# Patient Record
Sex: FEMALE | Race: BLACK OR AFRICAN AMERICAN | Employment: OTHER | URBAN - METROPOLITAN AREA
[De-identification: names, ages, dates, MRNs, and addresses within clinical notes are randomized per-mention and may not be internally consistent; named-entity substitution may affect disease eponyms.]

---

## 2021-03-13 ENCOUNTER — IMMUNIZATION (OUTPATIENT)
Dept: FAMILY MEDICINE CLINIC | Age: 66
End: 2021-03-13
Payer: COMMERCIAL

## 2021-03-13 PROCEDURE — 91303 COVID-19, J&J VACCINE, PF, 0.5 ML DOSE: CPT | Performed by: NURSE PRACTITIONER

## 2021-03-13 PROCEDURE — 0031A PR IMM ADMN SARSCOV2 AD26 5X1010VP/0.5 ML 1 DOSE: CPT | Performed by: NURSE PRACTITIONER

## 2021-10-14 ENCOUNTER — HOSPITAL ENCOUNTER (EMERGENCY)
Age: 66
Discharge: HOME | End: 2021-10-14
Attending: EMERGENCY MEDICINE
Payer: COMMERCIAL

## 2021-10-14 ENCOUNTER — APPOINTMENT (OUTPATIENT)
Dept: GENERAL RADIOLOGY | Age: 66
End: 2021-10-14
Payer: COMMERCIAL

## 2021-10-14 VITALS
TEMPERATURE: 98.3 F | DIASTOLIC BLOOD PRESSURE: 77 MMHG | WEIGHT: 145 LBS | HEART RATE: 76 BPM | RESPIRATION RATE: 14 BRPM | HEIGHT: 64 IN | OXYGEN SATURATION: 99 % | BODY MASS INDEX: 24.75 KG/M2 | SYSTOLIC BLOOD PRESSURE: 127 MMHG

## 2021-10-14 DIAGNOSIS — R51.9 NONINTRACTABLE HEADACHE, UNSPECIFIED CHRONICITY PATTERN, UNSPECIFIED HEADACHE TYPE: Primary | ICD-10-CM

## 2021-10-14 LAB
ANION GAP SERPL CALCULATED.3IONS-SCNC: 14 MMOL/L (ref 3–16)
BASOPHILS ABSOLUTE: 0 K/UL (ref 0–0.2)
BASOPHILS RELATIVE PERCENT: 0.7 %
BUN BLDV-MCNC: 12 MG/DL (ref 7–20)
CALCIUM SERPL-MCNC: 9.6 MG/DL (ref 8.3–10.6)
CHLORIDE BLD-SCNC: 101 MMOL/L (ref 99–110)
CO2: 23 MMOL/L (ref 21–32)
CREAT SERPL-MCNC: 0.6 MG/DL (ref 0.6–1.2)
EKG ATRIAL RATE: 96 BPM
EKG DIAGNOSIS: NORMAL
EKG P AXIS: 76 DEGREES
EKG P-R INTERVAL: 130 MS
EKG Q-T INTERVAL: 364 MS
EKG QRS DURATION: 74 MS
EKG QTC CALCULATION (BAZETT): 459 MS
EKG R AXIS: 4 DEGREES
EKG T AXIS: 52 DEGREES
EKG VENTRICULAR RATE: 96 BPM
EOSINOPHILS ABSOLUTE: 0 K/UL (ref 0–0.6)
EOSINOPHILS RELATIVE PERCENT: 0.2 %
GFR AFRICAN AMERICAN: >60
GFR NON-AFRICAN AMERICAN: >60
GLUCOSE BLD-MCNC: 81 MG/DL (ref 70–99)
HCT VFR BLD CALC: 36.2 % (ref 36–48)
HEMOGLOBIN: 12.1 G/DL (ref 12–16)
LYMPHOCYTES ABSOLUTE: 1.3 K/UL (ref 1–5.1)
LYMPHOCYTES RELATIVE PERCENT: 29 %
MCH RBC QN AUTO: 30.2 PG (ref 26–34)
MCHC RBC AUTO-ENTMCNC: 33.5 G/DL (ref 31–36)
MCV RBC AUTO: 90.1 FL (ref 80–100)
MONOCYTES ABSOLUTE: 0.3 K/UL (ref 0–1.3)
MONOCYTES RELATIVE PERCENT: 6.1 %
NEUTROPHILS ABSOLUTE: 2.8 K/UL (ref 1.7–7.7)
NEUTROPHILS RELATIVE PERCENT: 64 %
PDW BLD-RTO: 14.3 % (ref 12.4–15.4)
PLATELET # BLD: 508 K/UL (ref 135–450)
PMV BLD AUTO: 7.8 FL (ref 5–10.5)
POTASSIUM REFLEX MAGNESIUM: 3.9 MMOL/L (ref 3.5–5.1)
RBC # BLD: 4.01 M/UL (ref 4–5.2)
SODIUM BLD-SCNC: 138 MMOL/L (ref 136–145)
TROPONIN: <0.01 NG/ML
WBC # BLD: 4.4 K/UL (ref 4–11)

## 2021-10-14 PROCEDURE — 96375 TX/PRO/DX INJ NEW DRUG ADDON: CPT

## 2021-10-14 PROCEDURE — 71046 X-RAY EXAM CHEST 2 VIEWS: CPT

## 2021-10-14 PROCEDURE — 96374 THER/PROPH/DIAG INJ IV PUSH: CPT

## 2021-10-14 PROCEDURE — 80048 BASIC METABOLIC PNL TOTAL CA: CPT

## 2021-10-14 PROCEDURE — 85025 COMPLETE CBC W/AUTO DIFF WBC: CPT

## 2021-10-14 PROCEDURE — 99284 EMERGENCY DEPT VISIT MOD MDM: CPT

## 2021-10-14 PROCEDURE — 93005 ELECTROCARDIOGRAM TRACING: CPT | Performed by: EMERGENCY MEDICINE

## 2021-10-14 PROCEDURE — 84484 ASSAY OF TROPONIN QUANT: CPT

## 2021-10-14 PROCEDURE — 96361 HYDRATE IV INFUSION ADD-ON: CPT

## 2021-10-14 PROCEDURE — 36415 COLL VENOUS BLD VENIPUNCTURE: CPT

## 2021-10-14 PROCEDURE — 2580000003 HC RX 258: Performed by: EMERGENCY MEDICINE

## 2021-10-14 PROCEDURE — 6360000002 HC RX W HCPCS: Performed by: EMERGENCY MEDICINE

## 2021-10-14 RX ORDER — DICLOFENAC POTASSIUM 50 MG/1
50 TABLET, FILM COATED ORAL 2 TIMES DAILY PRN
COMMUNITY

## 2021-10-14 RX ORDER — ONDANSETRON 2 MG/ML
4 INJECTION INTRAMUSCULAR; INTRAVENOUS ONCE
Status: COMPLETED | OUTPATIENT
Start: 2021-10-14 | End: 2021-10-14

## 2021-10-14 RX ORDER — PROCHLORPERAZINE EDISYLATE 5 MG/ML
5 INJECTION INTRAMUSCULAR; INTRAVENOUS ONCE
Status: COMPLETED | OUTPATIENT
Start: 2021-10-14 | End: 2021-10-14

## 2021-10-14 RX ORDER — SODIUM CHLORIDE, SODIUM LACTATE, POTASSIUM CHLORIDE, AND CALCIUM CHLORIDE .6; .31; .03; .02 G/100ML; G/100ML; G/100ML; G/100ML
1000 INJECTION, SOLUTION INTRAVENOUS ONCE
Status: COMPLETED | OUTPATIENT
Start: 2021-10-14 | End: 2021-10-14

## 2021-10-14 RX ORDER — KETOROLAC TROMETHAMINE 30 MG/ML
15 INJECTION, SOLUTION INTRAMUSCULAR; INTRAVENOUS ONCE
Status: COMPLETED | OUTPATIENT
Start: 2021-10-14 | End: 2021-10-14

## 2021-10-14 RX ORDER — LISINOPRIL 10 MG/1
10 TABLET ORAL DAILY
COMMUNITY
Start: 2021-08-25

## 2021-10-14 RX ORDER — AMOXICILLIN 500 MG/1
500 CAPSULE ORAL 2 TIMES DAILY
COMMUNITY
Start: 2021-10-04

## 2021-10-14 RX ORDER — ACETAMINOPHEN 500 MG
1000 TABLET ORAL EVERY 6 HOURS PRN
COMMUNITY

## 2021-10-14 RX ORDER — LORAZEPAM 0.5 MG/1
0.5 TABLET ORAL DAILY
COMMUNITY
Start: 2020-09-14

## 2021-10-14 RX ORDER — CYCLOBENZAPRINE HCL 10 MG
10 TABLET ORAL 2 TIMES DAILY PRN
COMMUNITY
Start: 2021-02-22

## 2021-10-14 RX ORDER — BIOTIN 10000 MCG
1 CAPSULE ORAL 2 TIMES DAILY
COMMUNITY

## 2021-10-14 RX ADMIN — KETOROLAC TROMETHAMINE 15 MG: 30 INJECTION, SOLUTION INTRAMUSCULAR at 09:33

## 2021-10-14 RX ADMIN — ONDANSETRON 4 MG: 2 INJECTION INTRAMUSCULAR; INTRAVENOUS at 10:29

## 2021-10-14 RX ADMIN — SODIUM CHLORIDE, POTASSIUM CHLORIDE, SODIUM LACTATE AND CALCIUM CHLORIDE 1000 ML: 600; 310; 30; 20 INJECTION, SOLUTION INTRAVENOUS at 09:32

## 2021-10-14 RX ADMIN — PROCHLORPERAZINE EDISYLATE 5 MG: 5 INJECTION INTRAMUSCULAR; INTRAVENOUS at 09:33

## 2021-10-14 ASSESSMENT — PAIN SCALES - GENERAL
PAINLEVEL_OUTOF10: 9
PAINLEVEL_OUTOF10: 9

## 2021-10-14 ASSESSMENT — ENCOUNTER SYMPTOMS
SHORTNESS OF BREATH: 1
DIARRHEA: 1
COUGH: 0
ABDOMINAL PAIN: 0
STRIDOR: 0
BLOOD IN STOOL: 0
WHEEZING: 0
NAUSEA: 1

## 2021-10-14 ASSESSMENT — PAIN DESCRIPTION - LOCATION: LOCATION: HEAD

## 2021-10-14 ASSESSMENT — PAIN DESCRIPTION - ORIENTATION: ORIENTATION: ANTERIOR

## 2021-10-14 NOTE — ED PROVIDER NOTES
Date of evaluation: 10/14/2021    Chief Complaint   Hypertension (reports x 1 month, headache in front of head and behind ears )      Nursing Notes, Past Medical Hx, Past Surgical Hx, Social Hx, Allergies, and Family Hx were reviewed. History of Present Illness     Tasha Giles is a 77 y.o. female who presents for multiple complaints including concerned that she has hypertension, intermittent headaches for a month, palpitations and nausea/diarrhea. She is on lisinopril and has been having high blood pressure for about a month. She also has been having intermittent headaches for this month. Headaches are frontal and worsened by stress. She was previously diagnosed with an ear infection and has almost finished her course of amoxicillin for this. She is not currently having drainage from her ears but is still having intermittent pain. She has not eaten or drank in 2 days because she was worried about vomiting. She is having intermittent nausea and has a diarrhea for approximately a week. She denies fever, cough. She does report feeling like her heart was beating too loud or too fast this morning with exertion when she was walking up and down steps trying to get her granddaughter ready for school. She felt somewhat \"winded\" but denies having pain in her chest.  She denies lower extremity edema, calf tenderness, pain with deep breathing, abdominal pain focal weakness, visual disturbances or any other symptoms at this time. Review of Systems     Review of Systems   Constitutional: Positive for fatigue. Negative for fever. HENT: Positive for ear pain. Negative for congestion and ear discharge. Eyes: Negative for visual disturbance. Respiratory: Positive for shortness of breath. Negative for cough, wheezing and stridor. Cardiovascular: Positive for palpitations. Negative for chest pain and leg swelling. Gastrointestinal: Positive for diarrhea and nausea.  Negative for abdominal pain and blood in stool. Musculoskeletal: Negative for gait problem. Skin: Negative for rash. Neurological: Positive for dizziness, light-headedness and headaches. Negative for tremors, seizures, syncope, facial asymmetry, speech difficulty, weakness and numbness. All other systems reviewed and are negative. Past Medical, Surgical, Family, and Social History     She has a past medical history of Depression and Hypertension. She has no past surgical history on file. Her family history is not on file. She reports that she has never smoked. She does not have any smokeless tobacco history on file. She reports previous alcohol use. She reports previous drug use. Medications     Previous Medications    ACETAMINOPHEN (TYLENOL) 500 MG TABLET    Take 1,000 mg by mouth every 6 hours as needed for Pain    AMOXICILLIN (AMOXIL) 500 MG CAPSULE    Take 500 mg by mouth 2 times daily    APPLE CIDER VINEGAR PO    Take 1 capsule by mouth 2 times daily    BIOTIN 10 MG CAPS    Take 1 capsule by mouth 2 times daily    CYCLOBENZAPRINE (FLEXERIL) 10 MG TABLET    Take 10 mg by mouth 2 times daily as needed    DICLOFENAC (CATAFLAM) 50 MG TABLET    Take 50 mg by mouth 2 times daily as needed for Pain    LISINOPRIL (PRINIVIL;ZESTRIL) 10 MG TABLET    Take 10 mg by mouth daily    LORAZEPAM (ATIVAN) 0.5 MG TABLET    Take 0.5 mg by mouth daily. Allergies     She is allergic to hydrocodone-acetaminophen. Physical Exam     INITIAL VITALS: /77   Pulse 76   Temp 98.3 °F (36.8 °C) (Oral)   Resp 14   Ht 5' 4\" (1.626 m)   Wt 145 lb (65.8 kg)   SpO2 99%   BMI 24.89 kg/m²    Physical Exam  Vitals and nursing note reviewed. Constitutional:       General: She is not in acute distress. Appearance: Normal appearance. She is not ill-appearing or diaphoretic. HENT:      Head: Normocephalic and atraumatic. Right Ear: Tympanic membrane normal.      Left Ear: There is impacted cerumen. Nose: No congestion. Mouth/Throat:      Mouth: Mucous membranes are dry. Eyes:      Extraocular Movements: Extraocular movements intact. Conjunctiva/sclera: Conjunctivae normal.      Pupils: Pupils are equal, round, and reactive to light. Cardiovascular:      Rate and Rhythm: Regular rhythm. Tachycardia present. Pulses: Normal pulses. Heart sounds: Normal heart sounds. No murmur heard. No friction rub. No gallop. Pulmonary:      Effort: Pulmonary effort is normal. No respiratory distress. Breath sounds: Normal breath sounds. No stridor. No wheezing, rhonchi or rales. Chest:      Chest wall: No tenderness. Abdominal:      General: There is no distension. Palpations: Abdomen is soft. Tenderness: There is no abdominal tenderness. Musculoskeletal:         General: No swelling or tenderness. Cervical back: Normal range of motion and neck supple. Right lower leg: No edema. Left lower leg: No edema. Skin:     General: Skin is warm and dry. Capillary Refill: Capillary refill takes less than 2 seconds. Neurological:      General: No focal deficit present. Mental Status: She is alert and oriented to person, place, and time. Cranial Nerves: No cranial nerve deficit. Sensory: No sensory deficit. Motor: No weakness. Coordination: Coordination normal.   Psychiatric:         Behavior: Behavior normal.         Diagnostic Results     EKG   Indication: Palpitations, rate: 96, normal sinus rhythm, no ST or T wave changes concerning for acute ischemia, axis normal, intervals within normal limits    RADIOLOGY:  XR CHEST (2 VW)   Final Result      No acute disease. LABS:   Labs Reviewed   CBC WITH AUTO DIFFERENTIAL - Abnormal; Notable for the following components:       Result Value    Platelets 002 (*)     All other components within normal limits    Narrative:     Performed at:   [facility]  [address], [address]   Phone [phone]   BASIC METABOLIC PANEL W/ REFLEX TO MG FOR LOW K    Narrative:     Performed at: [facility]  [address], [address]   Phone [phone]   TROPONIN    Narrative:     Performed at: [facility]  [address], [address]   Phone [phone]       VITALS:  BP: 127/77, Temp: 98.3 °F (36.8 °C), Pulse: 76, Resp: 14     Procedures         ED Course     The patient was given the followingmedications:  Orders Placed This Encounter   Medications    lactated ringers bolus    ketorolac (TORADOL) injection 15 mg    prochlorperazine (COMPAZINE) injection 5 mg    ondansetron (ZOFRAN) injection 4 mg            CONSULTS:  None    MEDICAL DECISION MAKING     Tasha Morris is a 77 y.o. female presenting with headache, nausea, palpitations and concerned that her blood pressure was elevated. She has had poor p.o. intake for the past couple of days and I suspect her symptoms are exacerbated by dehydration. She was given fluids, antiemetics with significant improvement was able to tolerate eating and drinking. Given her exertional palpitations, we obtained troponin, EKG, chest x-ray which were all reassuring. Laboratory assessment including BMP, CBC was also reassuring. She does have a cerumen impaction which we will irrigate. Her blood pressure was normal while in the emergency department. I am not concerned about hypertensive emergency. She does not have any neurologic deficits, recent head trauma or features to suggest intracranial pathology. The patient was advised to follow-up with PCP. Discharge instructions including strict return precautions were given to the patient. All questions were addressed. The patient verbalizes understanding and is in agreement with the plan. Clinical Impression     1.  Nonintractable headache, unspecified chronicity pattern, unspecified headache type        Moodysri Marlow     PATIENT REFERRED TO:  Ganesh Gray MD  [facility] [facility] [id number]  [address] [place] [address]  [phone]            DISCHARGE MEDICATIONS:  New Prescriptions    No medications on file       DISPOSITION Decision To Discharge 10/14/2021 12:14:43 PM      Mehnaz Christiansen MD  10/14/21 [id number]

## 2021-10-14 NOTE — ED TRIAGE NOTES
Pt reports high blood pressure x 1 month, started on lisinopril. Frontal headache x 1 month. Headache behind ears, reports bilateral ear infections and taking amox for it. Pt reports shortness of breath x 1 day without alleviating factors. Pt reports no po intake x 2 days because she if worried about vomiting. Reports nausea at this time. Pt reports diarrhea x 1 weeks since starting amox.

## 2021-10-14 NOTE — ED NOTES
Bed: A05-05  Expected date:   Expected time:   Means of arrival:   Comments:  [facility]     Katiuska Pedro RN  10/14/21 [id number]

## 2021-10-14 NOTE — PROGRESS NOTES
Clinical Pharmacy Progress Note  Medication History     Admit Date: 10/14/2021    List of of current medications patient is taking is complete. Home Medication list in EPIC updated to reflect changes noted below. Source of information: Patient Conversation, Chart Review, Dispense Reports    Patient's home pharmacy: [id number] ([phone])     Changes made to medication list:   Medications added:    Biotin   Apple Cider Vinegar    Acetaminophen   Diclofenac  Other notes:    Patient medication list is now up to date.  Patient had stopped taking her lisinopril for a \"few days\" because she was afraid it would interact with the amoxicillin she was given for an ear infection. She stopped amoxicillin with ~3 days left due to side effects. Please call with any questions.   Jessie Molina, PharmD  PGY-1 Pharmacy Resident  [id number]  10/14/2021 11:46 AM

## 2022-11-06 ENCOUNTER — HOSPITAL ENCOUNTER (EMERGENCY)
Age: 67
Discharge: HOME | End: 2022-11-06
Attending: EMERGENCY MEDICINE
Payer: COMMERCIAL

## 2022-11-06 VITALS
BODY MASS INDEX: 20.78 KG/M2 | RESPIRATION RATE: 18 BRPM | SYSTOLIC BLOOD PRESSURE: 148 MMHG | DIASTOLIC BLOOD PRESSURE: 88 MMHG | WEIGHT: 121.7 LBS | HEIGHT: 64 IN | TEMPERATURE: 98.5 F | HEART RATE: 95 BPM | OXYGEN SATURATION: 99 %

## 2022-11-06 DIAGNOSIS — G89.29 CHRONIC PAIN OF LEFT KNEE: Primary | ICD-10-CM

## 2022-11-06 DIAGNOSIS — M54.12 CERVICAL RADICULOPATHY: ICD-10-CM

## 2022-11-06 DIAGNOSIS — M25.562 CHRONIC PAIN OF LEFT KNEE: Primary | ICD-10-CM

## 2022-11-06 PROCEDURE — 6370000000 HC RX 637 (ALT 250 FOR IP): Performed by: EMERGENCY MEDICINE

## 2022-11-06 PROCEDURE — 96372 THER/PROPH/DIAG INJ SC/IM: CPT

## 2022-11-06 PROCEDURE — 99284 EMERGENCY DEPT VISIT MOD MDM: CPT

## 2022-11-06 PROCEDURE — 6360000002 HC RX W HCPCS: Performed by: EMERGENCY MEDICINE

## 2022-11-06 RX ORDER — METHYLPREDNISOLONE 4 MG/1
TABLET ORAL
Qty: 1 KIT | Refills: 0 | Status: SHIPPED | OUTPATIENT
Start: 2022-11-06 | End: 2022-11-12

## 2022-11-06 RX ORDER — KETOROLAC TROMETHAMINE 30 MG/ML
30 INJECTION, SOLUTION INTRAMUSCULAR; INTRAVENOUS ONCE
Status: COMPLETED | OUTPATIENT
Start: 2022-11-06 | End: 2022-11-06

## 2022-11-06 RX ORDER — PREDNISONE 20 MG/1
40 TABLET ORAL ONCE
Status: COMPLETED | OUTPATIENT
Start: 2022-11-06 | End: 2022-11-06

## 2022-11-06 RX ADMIN — KETOROLAC TROMETHAMINE 30 MG: 30 INJECTION, SOLUTION INTRAMUSCULAR at 04:21

## 2022-11-06 RX ADMIN — PREDNISONE 40 MG: 20 TABLET ORAL at 04:21

## 2022-11-06 ASSESSMENT — PAIN SCALES - GENERAL
PAINLEVEL_OUTOF10: 10
PAINLEVEL_OUTOF10: 10
PAINLEVEL_OUTOF10: 8

## 2022-11-06 ASSESSMENT — PAIN - FUNCTIONAL ASSESSMENT
PAIN_FUNCTIONAL_ASSESSMENT: 0-10
PAIN_FUNCTIONAL_ASSESSMENT: 0-10

## 2022-11-06 ASSESSMENT — ENCOUNTER SYMPTOMS
GASTROINTESTINAL NEGATIVE: 1
EYES NEGATIVE: 1
RESPIRATORY NEGATIVE: 1

## 2022-11-06 ASSESSMENT — PAIN DESCRIPTION - FREQUENCY: FREQUENCY: CONTINUOUS

## 2022-11-06 ASSESSMENT — PAIN DESCRIPTION - PAIN TYPE
TYPE: ACUTE PAIN
TYPE: ACUTE PAIN;CHRONIC PAIN

## 2022-11-06 ASSESSMENT — PAIN DESCRIPTION - ORIENTATION
ORIENTATION: LEFT
ORIENTATION: RIGHT
ORIENTATION: RIGHT

## 2022-11-06 ASSESSMENT — PAIN DESCRIPTION - DESCRIPTORS
DESCRIPTORS: ACHING
DESCRIPTORS: ACHING

## 2022-11-06 ASSESSMENT — PAIN DESCRIPTION - LOCATION
LOCATION: KNEE

## 2022-11-06 NOTE — ED NOTES
Discharge instructions given per provider order. 1 Prescription(s) reviewed. Patient verbalized understanding.         Mago Ochoa RN  11/06/22 [id number]

## 2022-11-06 NOTE — DISCHARGE INSTRUCTIONS
Continue your current medications. Take Medrol Dosepak as prescribed. Follow-up with your pain management specialist for repeat evaluation and further testing if symptoms continue.

## 2022-11-06 NOTE — ED PROVIDER NOTES
[address]          ATTENDING PHYSICIAN NOTE       Date of evaluation: 11/6/2022    Chief Complaint     Knee Pain (Pt to ED with c/o increased knee pain. Was seen at [facility] earlier in the day and sts that they gave her a prescription for medication but she has been unable to get it filled. Sts pain radiates up her leg. No obvious deformity )      History of Present Illness     Tasha Estrada is a 79 y.o. female who presents to the emerge from a complaining of left knee pain and tingling in the left arm. Patient states over the last several days she has been having increasing pain and swelling to her left knee. She does have a history of arthritis to the knee and is scheduled to have injection through her pain management specialist in 3 days. She states the left knee pain was worse today so she was seen in outside hospital where they did an x-ray that showed evidence of osteoarthritis and they gave her a Percocet but she states that since that wore off she has been having increasing pain. She did try taking an Excedrin at home without improvement. She denies any fevers or chills. She denies any trauma. She states that she has been having tingling in her left arm that started today as well. She denies any chest pain or shortness of breath. She does note pain in her neck. She states she has had this tingling in the past.    Review of Systems     Review of Systems   Constitutional: Negative. HENT: Negative. Eyes: Negative. Respiratory: Negative. Cardiovascular: Negative. Gastrointestinal: Negative. Genitourinary: Negative. Musculoskeletal:  Positive for arthralgias, myalgias and neck pain. Neurological: Negative. All other systems reviewed and are negative. Past Medical, Surgical, Family, and Social History     She has a past medical history of Depression and Hypertension. She has no past surgical history on file.   Her family history is not on file. She reports that she has never smoked. She does not have any smokeless tobacco history on file. She reports that she does not currently use alcohol. She reports that she does not currently use drugs. Medications     Previous Medications    ACETAMINOPHEN (TYLENOL) 500 MG TABLET    Take 1,000 mg by mouth every 6 hours as needed for Pain    AMOXICILLIN (AMOXIL) 500 MG CAPSULE    Take 500 mg by mouth 2 times daily    APPLE CIDER VINEGAR PO    Take 1 capsule by mouth 2 times daily    BIOTIN 10 MG CAPS    Take 1 capsule by mouth 2 times daily    CYCLOBENZAPRINE (FLEXERIL) 10 MG TABLET    Take 10 mg by mouth 2 times daily as needed    DICLOFENAC (CATAFLAM) 50 MG TABLET    Take 50 mg by mouth 2 times daily as needed for Pain    LISINOPRIL (PRINIVIL;ZESTRIL) 10 MG TABLET    Take 10 mg by mouth daily    LORAZEPAM (ATIVAN) 0.5 MG TABLET    Take 0.5 mg by mouth daily. SERTRALINE (ZOLOFT) 50 MG TABLET    TAKE 1 1/2 TABLET BY MOUTH EVERY MORNING 1 TABLET DAILY       Allergies     She is allergic to hydrocodone-acetaminophen and ciprofloxacin. Physical Exam     INITIAL VITALS: BP: (!) 148/88, Temp: 98.5 °F (36.9 °C), Heart Rate: 95, Resp: 18, SpO2: 99 %   Physical Exam  Vitals and nursing note reviewed. Constitutional:       General: She is not in acute distress. Cardiovascular:      Rate and Rhythm: Normal rate and regular rhythm. Pulses: Normal pulses. Heart sounds: Normal heart sounds. Pulmonary:      Effort: Pulmonary effort is normal.      Breath sounds: Normal breath sounds. No wheezing, rhonchi or rales. Musculoskeletal:      Left knee: Effusion present. No erythema. Comments: Patient does have mild effusion present to the knee with no overlying erythema or warmth. She is able to range the knee. Patient does have a positive Spurling's test bilaterally. Neurological:      Mental Status: She is alert.        Diagnostic Results     RECENT VITALS:  BP: (!) 148/88,Temp: 98.5 °F (36.9 °C), Heart Rate: 95, Resp: 18, SpO2: 99 %     Procedures     N/A    ED Course     Nursing Notes, Past Medical Hx, Past Surgical Hx, Social Hx,Allergies, and Family Hx were reviewed. patient was given the following medications:  Orders Placed This Encounter   Medications    predniSONE (DELTASONE) tablet 40 mg    ketorolac (TORADOL) injection 30 mg    methylPREDNISolone (MEDROL DOSEPACK) 4 MG tablet     Sig: Take by mouth. Dispense:  1 kit     Refill:  0       CONSULTS:  None    MEDICAL DECISIONMAKING / ASSESSMENT / Kirillkiran Lisandro is a 79 y.o. female presents complaining of left knee pain and tingling in the left arm. Patient has a history of chronic knee pain for which she follows with pain management. Patient had an x-ray earlier today that showed no osseous abnormalities. Physical exam is not consistent with septic arthritis I do not feel arthrocentesis is indicated. Patient will be placed on a Medrol Dosepak to help with symptoms. As for the patient's arm numbness, this appears to be a cervical radiculopathy. I have low suspicion for this being acute coronary syndrome so do not feel that EKG or troponin are indicated. The Medrol Dosepak should help with this as well. She will be instructed to follow-up with her pain management specialist for further management of these symptoms. Clinical Impression     1. Chronic pain of left knee    2. Cervical radiculopathy        Disposition     PATIENT REFERRED TO:  Lyndsay Hoover MD  [address]  [phone]          Your primary care provider            DISCHARGE MEDICATIONS:  New Prescriptions    METHYLPREDNISOLONE (MEDROL DOSEPACK) 4 MG TABLET    Take by mouth.        DISPOSITION Decision To Discharge 11/06/2022 04:27:53 AM         Kurt Bey MD  11/06/22 8423

## 2025-01-20 ENCOUNTER — HOSPITAL ENCOUNTER (OUTPATIENT)
Dept: MAMMOGRAPHY | Age: 70
Discharge: HOME | End: 2025-01-25

## 2025-01-20 DIAGNOSIS — Z12.31 VISIT FOR SCREENING MAMMOGRAM: ICD-10-CM

## 2025-04-09 ENCOUNTER — HOSPITAL ENCOUNTER (OUTPATIENT)
Dept: MAMMOGRAPHY | Age: 70
Discharge: HOME | End: 2025-04-14

## 2025-04-09 DIAGNOSIS — Z12.31 VISIT FOR SCREENING MAMMOGRAM: ICD-10-CM
